# Patient Record
Sex: MALE | Race: OTHER | HISPANIC OR LATINO | URBAN - METROPOLITAN AREA
[De-identification: names, ages, dates, MRNs, and addresses within clinical notes are randomized per-mention and may not be internally consistent; named-entity substitution may affect disease eponyms.]

---

## 2023-09-13 ENCOUNTER — EMERGENCY (EMERGENCY)
Facility: HOSPITAL | Age: 58
LOS: 1 days | Discharge: HOME | End: 2023-09-13
Admitting: EMERGENCY MEDICINE
Payer: OTHER MISCELLANEOUS

## 2023-09-13 VITALS
OXYGEN SATURATION: 97 % | SYSTOLIC BLOOD PRESSURE: 150 MMHG | RESPIRATION RATE: 16 BRPM | DIASTOLIC BLOOD PRESSURE: 98 MMHG | HEART RATE: 77 BPM | TEMPERATURE: 98 F

## 2023-09-13 VITALS
DIASTOLIC BLOOD PRESSURE: 95 MMHG | HEART RATE: 71 BPM | SYSTOLIC BLOOD PRESSURE: 159 MMHG | OXYGEN SATURATION: 98 % | RESPIRATION RATE: 16 BRPM
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
## 2023-09-13 DIAGNOSIS — R07.81 PLEURODYNIA: ICD-10-CM

## 2023-09-13 DIAGNOSIS — M54.50 LOW BACK PAIN, UNSPECIFIED: ICD-10-CM

## 2023-09-13 DIAGNOSIS — I10 ESSENTIAL (PRIMARY) HYPERTENSION: ICD-10-CM

## 2023-09-13 DIAGNOSIS — R51.9 HEADACHE, UNSPECIFIED: ICD-10-CM

## 2023-09-13 DIAGNOSIS — E78.5 HYPERLIPIDEMIA, UNSPECIFIED: ICD-10-CM
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
## 2023-09-13 DIAGNOSIS — M25.512 PAIN IN LEFT SHOULDER: ICD-10-CM

## 2023-09-13 DIAGNOSIS — M25.511 PAIN IN RIGHT SHOULDER: ICD-10-CM

## 2023-09-13 PROCEDURE — 70450 CT HEAD/BRAIN W/O DYE: CPT | Mod: 26

## 2023-09-13 PROCEDURE — 71101 X-RAY EXAM UNILAT RIBS/CHEST: CPT | Mod: 26,LT

## 2023-09-13 PROCEDURE — 99284 EMERGENCY DEPT VISIT MOD MDM: CPT

## 2023-09-13 RX ORDER — ACETAMINOPHEN 500 MG
975 TABLET ORAL ONCE
Refills: 0 | Status: COMPLETED | OUTPATIENT
Start: 2023-09-13 | End: 2023-09-13

## 2023-09-13 RX ORDER — LIDOCAINE 4 G/100G
1 CREAM TOPICAL ONCE
Refills: 0 | Status: COMPLETED | OUTPATIENT
Start: 2023-09-13 | End: 2023-09-13

## 2023-09-13 RX ORDER — LIDOCAINE 4 G/100G
2 CREAM TOPICAL ONCE
Refills: 0 | Status: COMPLETED | OUTPATIENT
Start: 2023-09-13 | End: 2023-09-13

## 2023-09-13 RX ADMIN — LIDOCAINE 1 PATCH: 4 CREAM TOPICAL at 14:15

## 2023-09-13 RX ADMIN — Medication 975 MILLIGRAM(S): at 13:07

## 2023-09-13 RX ADMIN — LIDOCAINE 1 PATCH: 4 CREAM TOPICAL at 13:07

## 2023-09-13 NOTE — ED PROVIDER NOTE - NSFOLLOWUPINSTRUCTIONS_ED_ALL_ED_FT
Fall Prevention    WHAT YOU NEED TO KNOW:    Fall prevention includes ways to make your home and other areas safer. It also includes ways you can move more carefully to prevent a fall. Health conditions that cause changes in your blood pressure, vision, or muscle strength and coordination may increase your risk for falls. Medicines may also increase your risk for falls if they make you dizzy, weak, or sleepy.     DISCHARGE INSTRUCTIONS:    Call 911 or have someone else call if:     You have fallen and are unconscious.      You have fallen and cannot move part of your body.    Contact your healthcare provider if:     You have fallen and have pain or a headache.      You have questions or concerns about your condition or care.    Fall prevention tips:     Stand or sit up slowly. This may help you keep your balance and prevent falls.      Use assistive devices as directed. Your healthcare provider may suggest that you use a cane or walker to help you keep your balance. You may need to have grab bars put in your bathroom near the toilet or in the shower.      Wear shoes that fit well and have soles that . Wear shoes both inside and outside. Use slippers with good . Do not wear shoes with high heels.      Wear a personal alarm. This is a device that allows you to call 911 if you fall and need help. Ask your healthcare provider for more information.      Stay active. Exercise can help strengthen your muscles and improve your balance. Your healthcare provider may recommend water aerobics or walking. He or she may also recommend physical therapy to improve your coordination. Never start an exercise program without talking to your healthcare provider first.       Manage your medical conditions. Keep all appointments with your healthcare providers. Visit your eye doctor as directed.           Home safety tips:     Add items to prevent falls in the bathroom. Put nonslip strips on your bath or shower floor to prevent you from slipping. Use a bath mat if you do not have carpet in the bathroom. This will prevent you from falling when you step out of the bath or shower. Use a shower seat so you do not need to stand while you shower. Sit on the toilet or a chair in your bathroom to dry yourself and put on clothing. This will prevent you from losing your balance from drying or dressing yourself while you are standing.       Keep paths clear. Remove books, shoes, and other objects from walkways and stairs. Place cords for telephones and lamps out of the way so that you do not need to walk over them. Tape them down if you cannot move them. Remove small rugs. If you cannot remove a rug, secure it with double-sided tape. This will prevent you from tripping.       Install bright lights in your home. Use night lights to help light paths to the bathroom or kitchen. Always turn on the light before you start walking.      Keep items you use often on shelves within reach. Do not use a step stool to help you reach an item.      Paint or place reflective tape on the edges of your stairs. This will help you see the stairs better.    Follow up with your healthcare provider as directed: Write down your questions so you remember to ask them during your visits.      Head Injury    WHAT YOU NEED TO KNOW:    A head injury is most often caused by a blow to the head. This may occur from a fall, bicycle injury, sports injury, being struck in the head, or a motor vehicle accident.     DISCHARGE INSTRUCTIONS:    Call 911 or have someone else call for any of the following:     You cannot be woken.      You have a seizure.      You stop responding to others or you faint.      You have blurry or double vision.      Your speech becomes slurred or confused.      You have arm or leg weakness, loss of feeling, or new problems with coordination.      Your pupils are larger than usual or one pupil is a different size than the other.       You have blood or clear fluid coming out of your ears or nose.    Return to the emergency department if:     You have repeated or forceful vomiting.      You feel confused.      Your headache gets worse or becomes severe.      You or someone caring for you notices that you are harder to wake than usual.    Contact your healthcare provider if:     Your symptoms last longer than 6 weeks after the injury.      You have questions or concerns about your condition or care.    Medicines:     Acetaminophen decreases pain. Acetaminophen is available without a doctor's order. Ask how much to take and how often to take it. Follow directions. Acetaminophen can cause liver damage if not taken correctly.      Take your medicine as directed. Contact your healthcare provider if you think your medicine is not helping or if you have side effects. Tell him or her if you are allergic to any medicine. Keep a list of the medicines, vitamins, and herbs you take. Include the amounts, and when and why you take them. Bring the list or the pill bottles to follow-up visits. Carry your medicine list with you in case of an emergency.    Self-care:     Rest or do quiet activities for 24 to 48 hours. Limit your time watching TV, using the computer, or doing tasks that require a lot of thinking. Slowly return to your normal activities as directed. Do not play sports or do activities that may cause you to get hit in the head. Ask your healthcare provider when you can return to sports.       Apply ice on your head for 15 to 20 minutes every hour or as directed. Use an ice pack, or put crushed ice in a plastic bag. Cover it with a towel before you apply it to your skin. Ice helps prevent tissue damage and decreases swelling and pain.       Have someone stay with you for 24 hours or as directed. This person can monitor you for complications and call 911. When you are awake the person should ask you a few questions to see if you are thinking clearly. An example would be to ask your name or your address.     Prevent another head injury:     Wear a helmet that fits properly. Do this when you play sports, or ride a bike, scooter, or skateboard. Helmets help decrease your risk of a serious head injury. Talk to your healthcare provider about other ways you can protect yourself if you play sports.      Wear your seat belt every time you are in a car. This helps to decrease your risk for a head injury if you are in a car accident.     Follow up with your healthcare provider as directed: Write down your questions so you remember to ask them during your visits.

## 2023-09-13 NOTE — ED PROVIDER NOTE - PATIENT PORTAL LINK FT
You can access the FollowMyHealth Patient Portal offered by [facility] by registering at the following website: [email or web address]. By joining [employer]’s FollowMyHealth portal, you will also be able to view your health information using other applications (apps) compatible with our system.

## 2023-09-13 NOTE — ED PROVIDER NOTE - PHYSICAL EXAMINATION
VITAL SIGNS: I have reviewed nursing notes and confirm.   CONST: No apparent distress.  ENT: No nasal discharge; airway clear.  HEAD: Normocephalic; atraumatic. No obvious scalp abrasions or hematomas.   EYES: Sclera clear. Pupils round and symmetrical bilaterally.  CARD: S1, S2 normal; no murmurs, gallops, or rubs. Regular rate and rhythm.  RESP: No wheezes, rales or rhonchi. Breathing comfortably; speaking in full sentences.   MSK: +TTP to left 12th and 13th inferior posterior ribs. No midline vertebral tenderness or step off deformities. No cervical midline tenderness.   NEURO: Alert, oriented. Speech is fluent and appropriate. Moving all extremities appropriately. No gross motor or sensory abnormalities.   SKIN: Skin is normal temperature; no diaphoresis; no pallor.   PSYCH: Cooperative. Appropriate mood, language, and behavior.

## 2023-09-13 NOTE — ED ADULT NURSE NOTE - NSFALLRISKINTERV_ED_ALL_ED

## 2023-09-13 NOTE — ED ADULT TRIAGE NOTE - CHIEF COMPLAINT QUOTE
BIBEMS from work, slip/fall. Landed on buttocks. Pt states increased lower back pain. Denies hs. Pt was ambulatory on scene. hx htn, hld.

## 2023-09-13 NOTE — ED PROVIDER NOTE - PROGRESS NOTE DETAILS
discussed results. pt reports feeling improved but requesting lidocaine patches for his chronic bilat shoulder pain

## 2023-09-13 NOTE — ED PROVIDER NOTE - OBJECTIVE STATEMENT
56 y/o M with PMH of HLD, HTN on medications, presents s/p fall 5 hours ago while at work. He reports he fell down two steps landing on his buttocks. He is now complaining of a headache, left sided back pain, and bilateral shoulder pain. Patient is not on blood thinners. As per triage note, patient initially denied head strike but on our evaluation patient states he may have had a head strike. No LOC. Patient endorses smoking.

## 2023-09-13 NOTE — ED ADULT NURSE NOTE - OBJECTIVE STATEMENT
Pt slipped and fell down 2 steps and landed on his left back and arm, pt thinks he also struck his head but no LOC. No pain meds taken. No numbness/tingling, no n/v, no visual changes or dizziness. Pt is AOx4.

## 2023-09-13 NOTE — ED PROVIDER NOTE - CLINICAL SUMMARY MEDICAL DECISION MAKING FREE TEXT BOX
56 y/o M presents s/p fall with headache, left sided back pain, and shoulder pain today. On exam tenderness to palpation to left 12th and 13th inferior posterior ribs. Plan for pain medication and check CT Head and X-ray of ribs.

## 2023-09-28 PROBLEM — Z00.00 ENCOUNTER FOR PREVENTIVE HEALTH EXAMINATION: Status: ACTIVE | Noted: 2023-09-28

## 2024-09-17 ENCOUNTER — INPATIENT (INPATIENT)
Facility: HOSPITAL | Age: 59
LOS: 0 days | Discharge: HOME | DRG: 556 | End: 2024-09-18
Attending: GENERAL PRACTICE | Admitting: GENERAL PRACTICE
Payer: COMMERCIAL

## 2024-09-17 VITALS
TEMPERATURE: 98 F | RESPIRATION RATE: 16 BRPM | WEIGHT: 171.08 LBS | HEART RATE: 78 BPM | OXYGEN SATURATION: 96 % | SYSTOLIC BLOOD PRESSURE: 151 MMHG | DIASTOLIC BLOOD PRESSURE: 101 MMHG | HEIGHT: 61 IN

## 2024-09-17 PROCEDURE — 99285 EMERGENCY DEPT VISIT HI MDM: CPT

## 2024-09-18 ENCOUNTER — TRANSCRIPTION ENCOUNTER (OUTPATIENT)
Age: 59
End: 2024-09-18

## 2024-09-18 VITALS
RESPIRATION RATE: 16 BRPM | HEART RATE: 69 BPM | SYSTOLIC BLOOD PRESSURE: 152 MMHG | OXYGEN SATURATION: 96 % | DIASTOLIC BLOOD PRESSURE: 77 MMHG

## 2024-09-18 DIAGNOSIS — M25.512 PAIN IN LEFT SHOULDER: ICD-10-CM

## 2024-09-18 LAB
ALBUMIN SERPL ELPH-MCNC: 4.4 G/DL — SIGNIFICANT CHANGE UP (ref 3.3–5)
ALP SERPL-CCNC: 169 U/L — HIGH (ref 40–120)
ALT FLD-CCNC: 97 U/L — HIGH (ref 10–45)
ANION GAP SERPL CALC-SCNC: 17 MMOL/L — SIGNIFICANT CHANGE UP (ref 5–17)
APTT BLD: 33 SEC — SIGNIFICANT CHANGE UP (ref 24.5–35.6)
AST SERPL-CCNC: 101 U/L — HIGH (ref 10–40)
BASOPHILS # BLD AUTO: 0.11 K/UL — SIGNIFICANT CHANGE UP (ref 0–0.2)
BASOPHILS NFR BLD AUTO: 1.2 % — SIGNIFICANT CHANGE UP (ref 0–2)
BILIRUB SERPL-MCNC: 0.6 MG/DL — SIGNIFICANT CHANGE UP (ref 0.2–1.2)
BUN SERPL-MCNC: 17 MG/DL — SIGNIFICANT CHANGE UP (ref 7–23)
CALCIUM SERPL-MCNC: 9.2 MG/DL — SIGNIFICANT CHANGE UP (ref 8.4–10.5)
CHLORIDE SERPL-SCNC: 99 MMOL/L — SIGNIFICANT CHANGE UP (ref 96–108)
CO2 SERPL-SCNC: 17 MMOL/L — LOW (ref 22–31)
CREAT SERPL-MCNC: 0.72 MG/DL — SIGNIFICANT CHANGE UP (ref 0.5–1.3)
EGFR: 107 ML/MIN/1.73M2 — SIGNIFICANT CHANGE UP
EOSINOPHIL # BLD AUTO: 0.52 K/UL — HIGH (ref 0–0.5)
EOSINOPHIL NFR BLD AUTO: 5.8 % — SIGNIFICANT CHANGE UP (ref 0–6)
GLUCOSE SERPL-MCNC: 122 MG/DL — HIGH (ref 70–99)
HCT VFR BLD CALC: 48 % — SIGNIFICANT CHANGE UP (ref 39–50)
HGB BLD-MCNC: 16.9 G/DL — SIGNIFICANT CHANGE UP (ref 13–17)
IMM GRANULOCYTES NFR BLD AUTO: 0.8 % — SIGNIFICANT CHANGE UP (ref 0–0.9)
INR BLD: 0.98 RATIO — SIGNIFICANT CHANGE UP (ref 0.85–1.18)
LYMPHOCYTES # BLD AUTO: 2.28 K/UL — SIGNIFICANT CHANGE UP (ref 1–3.3)
LYMPHOCYTES # BLD AUTO: 25.5 % — SIGNIFICANT CHANGE UP (ref 13–44)
MCHC RBC-ENTMCNC: 32.6 PG — SIGNIFICANT CHANGE UP (ref 27–34)
MCHC RBC-ENTMCNC: 35.2 GM/DL — SIGNIFICANT CHANGE UP (ref 32–36)
MCV RBC AUTO: 92.7 FL — SIGNIFICANT CHANGE UP (ref 80–100)
MONOCYTES # BLD AUTO: 0.77 K/UL — SIGNIFICANT CHANGE UP (ref 0–0.9)
MONOCYTES NFR BLD AUTO: 8.6 % — SIGNIFICANT CHANGE UP (ref 2–14)
NEUTROPHILS # BLD AUTO: 5.2 K/UL — SIGNIFICANT CHANGE UP (ref 1.8–7.4)
NEUTROPHILS NFR BLD AUTO: 58.1 % — SIGNIFICANT CHANGE UP (ref 43–77)
NRBC # BLD: 0 /100 WBCS — SIGNIFICANT CHANGE UP (ref 0–0)
NT-PROBNP SERPL-SCNC: <36 PG/ML — SIGNIFICANT CHANGE UP (ref 0–300)
PLATELET # BLD AUTO: 189 K/UL — SIGNIFICANT CHANGE UP (ref 150–400)
POTASSIUM SERPL-MCNC: 4.1 MMOL/L — SIGNIFICANT CHANGE UP (ref 3.5–5.3)
POTASSIUM SERPL-SCNC: 4.1 MMOL/L — SIGNIFICANT CHANGE UP (ref 3.5–5.3)
PROT SERPL-MCNC: 7.9 G/DL — SIGNIFICANT CHANGE UP (ref 6–8.3)
PROTHROM AB SERPL-ACNC: 10.8 SEC — SIGNIFICANT CHANGE UP (ref 9.5–13)
RBC # BLD: 5.18 M/UL — SIGNIFICANT CHANGE UP (ref 4.2–5.8)
RBC # FLD: 12.7 % — SIGNIFICANT CHANGE UP (ref 10.3–14.5)
SODIUM SERPL-SCNC: 133 MMOL/L — LOW (ref 135–145)
TROPONIN T, HIGH SENSITIVITY RESULT: <6 NG/L — SIGNIFICANT CHANGE UP (ref 0–51)
WBC # BLD: 8.95 K/UL — SIGNIFICANT CHANGE UP (ref 3.8–10.5)
WBC # FLD AUTO: 8.95 K/UL — SIGNIFICANT CHANGE UP (ref 3.8–10.5)

## 2024-09-18 PROCEDURE — C1769: CPT

## 2024-09-18 PROCEDURE — 85730 THROMBOPLASTIN TIME PARTIAL: CPT

## 2024-09-18 PROCEDURE — C1887: CPT

## 2024-09-18 PROCEDURE — 83880 ASSAY OF NATRIURETIC PEPTIDE: CPT

## 2024-09-18 PROCEDURE — 71045 X-RAY EXAM CHEST 1 VIEW: CPT

## 2024-09-18 PROCEDURE — 73030 X-RAY EXAM OF SHOULDER: CPT | Mod: 26,LT

## 2024-09-18 PROCEDURE — C1894: CPT

## 2024-09-18 PROCEDURE — 99152 MOD SED SAME PHYS/QHP 5/>YRS: CPT

## 2024-09-18 PROCEDURE — 93458 L HRT ARTERY/VENTRICLE ANGIO: CPT

## 2024-09-18 PROCEDURE — 73030 X-RAY EXAM OF SHOULDER: CPT

## 2024-09-18 PROCEDURE — 80053 COMPREHEN METABOLIC PANEL: CPT

## 2024-09-18 PROCEDURE — 93005 ELECTROCARDIOGRAM TRACING: CPT

## 2024-09-18 PROCEDURE — 93458 L HRT ARTERY/VENTRICLE ANGIO: CPT | Mod: 26

## 2024-09-18 PROCEDURE — 84484 ASSAY OF TROPONIN QUANT: CPT

## 2024-09-18 PROCEDURE — 85610 PROTHROMBIN TIME: CPT

## 2024-09-18 PROCEDURE — 99285 EMERGENCY DEPT VISIT HI MDM: CPT

## 2024-09-18 PROCEDURE — 71045 X-RAY EXAM CHEST 1 VIEW: CPT | Mod: 26

## 2024-09-18 PROCEDURE — 85025 COMPLETE CBC W/AUTO DIFF WBC: CPT

## 2024-09-18 RX ORDER — BENZOCAINE/MENTHOL 20 %-0.5 %
1 AEROSOL (GRAM) TOPICAL ONCE
Refills: 0 | Status: DISCONTINUED | OUTPATIENT
Start: 2024-09-18 | End: 2024-09-18

## 2024-09-18 RX ORDER — ASPIRIN 81 MG
324 TABLET, DELAYED RELEASE (ENTERIC COATED) ORAL ONCE
Refills: 0 | Status: COMPLETED | OUTPATIENT
Start: 2024-09-18 | End: 2024-09-18

## 2024-09-18 RX ADMIN — Medication 324 MILLIGRAM(S): at 02:50

## 2024-09-18 NOTE — ED PROVIDER NOTE - PHYSICAL EXAMINATION
CONSTITUTIONAL: Well appearing and in no apparent distress.  ENT: Airway patent, moist mucous membranes.   EYES: Pupils equal, round and reactive to light. EOMI. Conjunctiva normal appearing.   CARDIAC: Normal rate, regular rhythm.  Heart sounds S1, S2.    RESPIRATORY: Breath sounds clear and equal bilaterally.   GASTROINTESTINAL: Abdomen soft, non-tender, not distended.  MUSCULOSKELETAL: Spine appears normal. +L anterior shoulder focal TTP. No swelling. No crepitus. FROM of extremity.   NEUROLOGICAL: Alert and oriented x3, no focal deficits.

## 2024-09-18 NOTE — DISCHARGE NOTE PROVIDER - NSDCFUADDAPPT_GEN_ALL_CORE_FT
Please call Karen for follow up next week you will need a sonogram of the abdoment to evaluate your elevated liver enzymes. You would also benefit from a gastroenterolgy refferal . Please schedule the follow up by next week .

## 2024-09-18 NOTE — ED ADULT NURSE REASSESSMENT NOTE - NS ED NURSE REASSESS COMMENT FT1
Report received from ANN-MARIE Mckee. Pt A&Ox3, IV intact and patent, VSS continuous pulse ox DC, pt denies pain/discomfort, bed locked and in lowest position, call bell within reach and pt instructed on use, safety and comfort measures maintained.

## 2024-09-18 NOTE — ED PROVIDER NOTE - ATTENDING APP SHARED VISIT CONTRIBUTION OF CARE
58M with multiple cardiac rf including CAD with stents on dapt here for exertional L shoulder pain with diaphoresis and shortness of breath, ref to ED by cards for evaluation of unstable angina and anticipated cardiac cath. Denies current sxs, infx sxs, weight changes, trauma.    Hemodynamically stable. NAD. AAOx4 (person, place, time, event). PERRL 3mm, EOMI w/o nystagmus, well hydrated, RRR, no audible cardiac murmurs. clear lungs, no inc work of breathing. benign abdomen, - jolly, no peritoneal signs, no cva ttp. no visible rashes nor deformities, no signs of jaundice, fluid overload, nor anemia. symm calves, no edema. full str/rom/neurovasc all 4 extrem. Steady gait.     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 No classic chest pain. Nontender extrem. Eval for acs. Discuss with outpt cards. Check labs, cardiac biomarkers, EKG, CXR, place on cardiac monitor for telemetry monitoring. Summary of presentation, physical exam findings, plan, expected turn around time for diagnostics discussed with patient. Agrees.     -> labs nonactionable. Mild transaminitis, trop neg. Admit for cath per outpt provider.

## 2024-09-18 NOTE — CONSULT NOTE ADULT - SUBJECTIVE AND OBJECTIVE BOX
CHIEF COMPLAINT: cp    HISTORY OF PRESENT ILLNESS:   57 yo M with a PMH of CAD sp stents x 3 followed by Cards, Dr. Monroy, HLD, HTN p/w non radiating L shoulder pain x this afternoon. 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 Also drank a redbull quickly when sxs developed. He became mildly diaphoretic and had transient SOB. Those sxs have persisted but pt still c/o mild L shoulder pain worse with palpation of joint. Pt was seen by his Cardiologist today for sxs and had EKG/ECHO in the office, was advised to come to ED for admission for cardiac testing. Denies nausea, vomiting, fever, chills, chest pain, cough, palpitations, leg pain/swelling, dizziness, syncope. Took his meds today.          Allergies    Allergy Status Unknown    Intolerances    	    MEDICATIONS:  see med rec                PAST MEDICAL & SURGICAL HISTORY:  CAD (coronary artery disease)  sp stents x 3      HLD (hyperlipidemia)          FAMILY HISTORY:      SOCIAL HISTORY:    non smoker. indep in adl      REVIEW OF SYSTEMS:  See HPI, otherwise complete 10 point review of systems negative    [ ] All others negative	      PHYSICAL EXAM:  T(C): 36.6 (09-18-24 @ 06:16), Max: 36.6 (09-18-24 @ 01:58)  HR: 64 (09-18-24 @ 06:16) (63 - 78)  BP: 151/86 (09-18-24 @ 06:16) (145/89 - 151/101)  RR: 20 (09-18-24 @ 06:16) (16 - 20)  SpO2: 95% (09-18-24 @ 06:16) (95% - 96%)  Wt(kg): --  I&O's Summary      Appearance: No Acute Distress	  HEENT:  Normal oral mucosa, PERRL, EOMI	  Cardiovascular: Normal S1 S2, No JVD, No murmurs/rubs/gallops  Respiratory: Lungs clear to auscultation bilaterally  Gastrointestinal:  Soft, Non-tender, + BS	  Skin: No rashes, No ecchymoses, No cyanosis	  Neurologic: Non-focal  Extremities: No clubbing, cyanosis or edema  Vascular: Peripheral pulses palpable 2+ bilaterally  Psychiatry: A & O x 3, Mood & affect appropriate    Laboratory Data:	 	    CBC Full  -  ( 18 Sep 2024 02:37 )  WBC Count : 8.95 K/uL  Hemoglobin : 16.9 g/dL  Hematocrit : 48.0 %  Platelet Count - Automated : 189 K/uL  Mean Cell Volume : 92.7 fl  Mean Cell Hemoglobin : 32.6 pg  Mean Cell Hemoglobin Concentration : 35.2 gm/dL  Auto Neutrophil # : 5.20 K/uL  Auto Lymphocyte # : 2.28 K/uL  Auto Monocyte # : 0.77 K/uL  Auto Eosinophil # : 0.52 K/uL  Auto Basophil # : 0.11 K/uL  Auto Neutrophil % : 58.1 %  Auto Lymphocyte % : 25.5 %  Auto Monocyte % : 8.6 %  Auto Eosinophil % : 5.8 %  Auto Basophil % : 1.2 %    09-18    133[L]  |  99  |  17  ----------------------------<  122[H]  4.1   |  17[L]  |  0.72    Ca    9.2      18 Sep 2024 02:37    TPro  7.9  /  Alb  4.4  /  TBili  0.6  /  DBili  x   /  AST  101[H]  /  ALT  97[H]  /  AlkPhos  169[H]  09-18      proBNP:   Lipid Profile:   HgA1c:   TSH:       CARDIAC MARKERS:            Interpretation of Telemetry: 	    ECG:  	  RADIOLOGY:  OTHER: 	    PREVIOUS DIAGNOSTIC TESTING:    [ ] Echocardiogram:  [ ] Catheterization:  [ ] Stress Test:  	    Assessment:  57 yo M with a PMH of CAD sp stents x 3 followed by Cards, Dr. Monroy, HLD, HTN p/w non radiating L shoulder pain x this afternoon.     Recs:  cardiac stable  no e/o ACS  LHC today with Dr Richardson for unstable angina  cw antiplatelet, statin and antianginals  GI eval and RUQ sono for elevated LFT  will follow        Greater than 60 minutes spent on total encounter; more than 50% of the visit was spent counseling and/or coordinating care by the attending physician.   	  Domingo Ovalles MD   Cardiovascular Diseases  [phone]

## 2024-09-18 NOTE — DISCHARGE NOTE PROVIDER - NSDCCPCAREPLAN_GEN_ALL_CORE_FT
PRINCIPAL DISCHARGE DIAGNOSIS  Diagnosis: Pain in the chest  Assessment and Plan of Treatment: s/p [facility] non onstructive disease outaptient follow up      SECONDARY DISCHARGE DIAGNOSES  Diagnosis: Abnormal LFTs  Assessment and Plan of Treatment:

## 2024-09-18 NOTE — H&P CARDIOLOGY - HISTORY OF PRESENT ILLNESS
59 yo M with a PMH of CAD sp stents x 3 followed by Cards, Dr. Monroy, HLD, HTN p/w non radiating L shoulder pain x this afternoon. 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 Also drank a redbull quickly when sxs developed. He became mildly diaphoretic and had transient SOB. Those sxs have persisted but pt still c/o mild L shoulder pain worse with palpation of joint. Pt was seen by his Cardiologist today for sxs and had EKG/ECHO in the office, was advised to come to ED for admission for cardiac testing. Denies nausea, vomiting, fever, chills, chest pain, cough, palpitations, leg pain/swelling, dizziness, syncope. Took his meds today.       He is now s/p cardiac cath with diagnostic findings . Patient has no complaints fever or chills, no N/V/D or abd pain.

## 2024-09-18 NOTE — DISCHARGE NOTE PROVIDER - HOSPITAL COURSE
59 yo M with a PMH of CAD sp stents x 3 followed by Cards, Dr. Monroy, HLD, HTN p/w non radiating L shoulder pain x this afternoon. 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 Also drank a redbull quickly when sxs developed. He became mildly diaphoretic and had transient SOB. Those sxs have persisted but pt still c/o mild L shoulder pain worse with palpation of joint. Pt was seen by his Cardiologist today for sxs and had EKG/ECHO in the office, was advised to come to ED for admission for cardiac testing. Denies nausea, vomiting, fever, chills, chest pain, cough, palpitations, leg pain/swelling, dizziness, syncope. Took his meds today.    L shoulder x-ray (PRELIM)  No acute fracture or dislocation.    CXR (PRELIM)  No acute findings in the chest.    Patient with mildly elevated LFTs he refuses to be admitted for abd sono and GI eval he prefers to be discharged home with outpatient followup . Case d/w Dr Salas and patient is stable for dc to home with outpatient follow up . 59 yo M with a PMH of CAD sp stents x 3 followed by Cards, Dr. Monroy, HLD, HTN p/w non radiating L shoulder pain x this afternoon. 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 Also drank a redbull quickly when sxs developed. He became mildly diaphoretic and had transient SOB. Those sxs have persisted but pt still c/o mild L shoulder pain worse with palpation of joint. Pt was seen by his Cardiologist today for sxs and had EKG/ECHO in the office, was advised to come to ED for admission for cardiac testing. Denies nausea, vomiting, fever, chills, chest pain, cough, palpitations, leg pain/swelling, dizziness, syncope. Took his meds today.    L shoulder x-ray (PRELIM)  No acute fracture or dislocation.    CXR (PRELIM)  No acute findings in the chest.    Patient with mildly elevated LFTs he refuses to be admitted for abd sono and GI eval he prefers to be discharged home with outpatient followup . Case d/w Dr Salas and patient is stable for dc to home with outpatient follow up .     attending addendum // patient admitted for cath, had cardiac cath in AM and cleared by cardio .. patient declined and preferred to follow up with GI as outpatient as noted above and was discharged home post cath ( patient not seen or examined by me) ..

## 2024-09-18 NOTE — ED ADULT NURSE NOTE - NSFALLHARMRISKINTERV_ED_ALL_ED

## 2024-09-18 NOTE — DISCHARGE NOTE NURSING/CASE MANAGEMENT/SOCIAL WORK - PATIENT PORTAL LINK FT
You can access the FollowMyHealth Patient Portal offered by [facility] by registering at the following website: [email or web address]. By joining [employer]’s FollowMyHealth portal, you will also be able to view your health information using other applications (apps) compatible with our system.

## 2024-09-18 NOTE — ED ADULT NURSE NOTE - OBJECTIVE STATEMENT
59 y/o M, pmh HTN, HLD, cardiac stent, on Plavix, EF 50%, presents to the ED for L sided chest pain x 1 day. pt describes the pain as non radiating, pressure, 4/10 pain. pt denies sob, dizziness, weakness, difficulty breathing, numbness / tingling, fever, chills, NVD, abd pain, back pain. pt denies taking medication for pain today. pt states last echo relieved 50%. pt A&Ox4, speech is clear, following commands, ambulating ind without difficulty.

## 2024-09-18 NOTE — ED ADULT NURSE NOTE - NSTOBACCO TYPE_GEN_A_CORE_RD
[facility] Care Coordination Contact    Care Coordinator received a voicemail from Morenita requesting a neck brace and a forehead thermometer if possible.    Care Coordinator emailed [facility] requesting these two items above and asking if [facility] can pay for them.  Care Coordinator requested a return e-mail with cost of items for waiver authorization.    ASHLEIGH Guerra  [facility]  [phone]     Cigarettes

## 2024-09-18 NOTE — ED PROVIDER NOTE - EKG/XRAY ADDITIONAL INFORMATION
Juan M RAMOS (ED Attending), independently interpreted the EKG:  Normal sinus rhythm rate 78   QTc 449, right bundle branch block no diagnostic ischemic ekg changes.

## 2024-09-18 NOTE — DISCHARGE NOTE PROVIDER - CARE PROVIDER_API CALL
Jimmie Pollard  Internal Medicine  [address] [place] [address]  Phone: [phone]  Fax: [phone]  Established Patient  Follow Up Time: 1 week

## 2024-09-18 NOTE — ED PROVIDER NOTE - PROGRESS NOTE DETAILS
Called pts Cardiologist, Dr. Mehrdad Monroy who sent pt in. [facility] to call back regarding plan/dispo. Prudence Dias PA-C Called Dr. Monroy again but no answer. Spoke with Dr. Salas who accepted pt for admission. Prudence Dias PA-C

## 2024-09-18 NOTE — ED PROVIDER NOTE - OBJECTIVE STATEMENT
59 yo M with a PMH of CAD sp stents x 3 followed by Cards, Dr. Monroy, HLD, HTN p/w non radiating L shoulder pain x this afternoon. 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 Also drank a redbull quickly when sxs developed. He became mildly diaphoretic and had transient SOB. Those sxs have persisted but pt still c/o mild L shoulder pain worse with palpation of joint. Pt was seen by his Cardiologist today for sxs and had EKG/ECHO in the office, was advised to come to ED for admission for cardiac testing. Denies nausea, vomiting, fever, chills, chest pain, cough, palpitations, leg pain/swelling, dizziness, syncope. Took his meds today.

## 2024-09-18 NOTE — DISCHARGE NOTE NURSING/CASE MANAGEMENT/SOCIAL WORK - NSDCPEFALRISK_GEN_ALL_CORE
For information on Fall & Injury Prevention, visit: [email or web address] OR  [email or web address] OR  https://www.cdc.gov/steadi/patient.html

## 2024-09-18 NOTE — DISCHARGE NOTE PROVIDER - NSDCMRMEDTOKEN_GEN_ALL_CORE_FT
aspirin 81 mg oral tablet: 1 tab(s) orally once a day  atorvastatin 40 mg oral tablet: 1 tab(s) orally once a day  clopidogrel 75 mg oral tablet: 1 tab(s) orally once a day  losartan 25 mg oral tablet: 1 tab(s) orally once a day  metoprolol tartrate 25 mg oral tablet: 1 tab(s) orally 2 times a day

## 2024-10-04 PROBLEM — E78.5 HYPERLIPIDEMIA, UNSPECIFIED: Chronic | Status: ACTIVE | Noted: 2023-09-13

## 2024-10-04 PROBLEM — I10 ESSENTIAL (PRIMARY) HYPERTENSION: Chronic | Status: ACTIVE | Noted: 2023-09-13

## 2024-10-04 RX ORDER — LOSARTAN POTASSIUM 50 MG/1
1 TABLET ORAL
Refills: 0 | DISCHARGE

## 2024-10-04 RX ORDER — ASPIRIN 81 MG
1 TABLET, DELAYED RELEASE (ENTERIC COATED) ORAL
Refills: 0 | DISCHARGE

## 2024-10-04 RX ORDER — METOPROLOL TARTRATE 100 MG/1
1 TABLET ORAL
Refills: 0 | DISCHARGE